# Patient Record
Sex: FEMALE | Race: WHITE
[De-identification: names, ages, dates, MRNs, and addresses within clinical notes are randomized per-mention and may not be internally consistent; named-entity substitution may affect disease eponyms.]

---

## 2019-09-13 NOTE — EDM.PDOC
ED HPI GENERAL MEDICAL PROBLEM





- General


Chief Complaint: Upper Extremity Injury/Pain


Stated Complaint: FALL


Time Seen by Provider: 09/13/19 19:00


Source of Information: Reports: Patient, Family


History Limitations: Reports: No Limitations





- History of Present Illness


INITIAL COMMENTS - FREE TEXT/NARRATIVE: 





This patient presents to the ED for evaluation of right shoulder pain. She 

states she was entering a store when she tripped on the door frame and fell 

forward. She is complaining of right shoulder pain. She also has significant 

swelling of both eyes; significant bruising to right eye and moderate bruising 

to right eye. She states she did not lose consciousness and is able to recount 

the details of the fall. She denies headache, nausea, vomiting. She admits to 

having 2 alcohol drinks prior to the fall.


Onset: Today, Sudden


Onset Date: 09/13/19


Onset Time: 17:30


Location: Reports: Face, Upper Extremity, Right





- Related Data


 Allergies











Allergy/AdvReac Type Severity Reaction Status Date / Time


 


No Known Allergies Allergy   Verified 09/13/19 19:17











Home Meds: 


 Home Meds





. [Unable to Verify Home Med List]  09/13/19 [History]











Review of Systems





- Review of Systems


Review Of Systems: See Below


Constitutional: Reports: No Symptoms


Eyes: Reports: Other (bruising and swelling).  Denies: Blurred Vision, Pain, 

Photophobia, Vision Change


Ears: Reports: No Symptoms


Nose: Reports: No Symptoms


Mouth/Throat: Reports: No Symptoms


Respiratory: Denies: Shortness of Breath, Wheezing, Cough


Cardiovascular: Reports: No Symptoms


GI/Abdominal: Reports: No Symptoms


Musculoskeletal: Reports: Shoulder Pain


Skin: Reports: Bruising


Neurological: Reports: No Symptoms





ED EXAM, GENERAL





- Physical Exam


Exam: See Below


Exam Limited By: No Limitations


General Appearance: Alert, WD/WN, No Apparent Distress


Eye Exam: Bilateral Eye: Normal Fundi, PERRL, Other (Significant swelling and 

bruising of right eye; moderate swelling and bruising of left eye. Ocular 

movements intact.)


Ears: Normal External Exam


Nose: Normal Inspection


Throat/Mouth: Normal Inspection, Normal Teeth


Head: Atraumatic, Normocephalic


Neck: Normal Inspection, Supple, Non-Tender, Full Range of Motion


Respiratory/Chest: No Respiratory Distress


Extremities: Normal Inspection, Joint Swelling (small amount swelling over AC 

joint; no discoloration or deformity. Tenderness with palpation of AC joint.), 

Limited Range of Motion (right shoulder)


Skin Exam: Warm, Dry





Course





- Vital Signs


Last Recorded V/S: 


 Last Vital Signs











Temp  36.2 C   09/13/19 20:07


 


Pulse  81   09/13/19 20:07


 


Resp  20   09/13/19 20:07


 


BP  147/74 H  09/13/19 20:07


 


Pulse Ox  94 L  09/13/19 20:07














- Orders/Labs/Meds


Orders: 


 Active Orders 24 hr











 Category Date Time Status


 


 Head wo Cont [CT] Stat Exams  09/13/19 19:18 Ordered


 


 Shoulder Comp Rt [CR] Stat Exams  09/13/19 19:08 Ordered











Meds: 


Medications














Discontinued Medications














Generic Name Dose Route Start Last Admin





  Trade Name Pbq  PRN Reason Stop Dose Admin


 


Hydromorphone HCl  1 mg  09/13/19 19:37  09/13/19 19:47





  Dilaudid  IM  09/13/19 19:38  1 mg





  ONETIME ONE   Administration





     





     





     





     


 


Hydromorphone HCl  Confirm  09/13/19 19:46  09/13/19 20:01





  Dilaudid  Administered  09/13/19 19:47  Not Given





  Dose   





  2 mg   





  .ROUTE   





  .STK-MED ONE   





     





     





     





     


 


Ketorolac Tromethamine  30 mg  09/13/19 19:04  09/13/19 19:04





  Toradol  IM  09/13/19 19:05  30 mg





  ONETIME ONE   Administration





     





     





     





     














- Re-Assessments/Exams


Free Text/Narrative Re-Assessment/Exam: 





09/13/19 20:32


This patient presents for evaluation of right shoulder pain after a fall. CMS 

is intact distally in the extremity and there are no open injuries.  X-rays 

reveal a transverse fracture of the proximal humerus that does not need 

reduction at this time. I did consult with Dr. Aranda from [facility] who suggest placing the patient in a sling to be seen in their 

office early next week. The patient understands that this need for reduction and

/or surgery may change with time and orthopedic consultation. Close orthopedic 

follow up is indicated in the next 3-4 days. Splint and fracture precautions 

for home. The patient does have some facial swelling and bruising and a head CT 

was obtained. This was negative for acute findings. The head to toe trauma exam 

is otherwise normal at this time and no further trauma workup is needed as I 

believe there is no signs of serious head, neck, chest, spinal, extremity or 

abdominal injuries. The patient was educated on natural course of this fracture

, provided pain medication, need for increasing gentle activity, and possible 

complications of fractures. They need to follow up with [facility] 

early next week. She was given contact information. She should return to the ED 

over the weekend should she develop any new or concerning symptoms. The patient 

was stable at the time of discharge and left in the care of her daughter.





Departure





- Departure


Time of Disposition: 20:45


Disposition: Home, Self-Care 01


Condition: Fair


Clinical Impression: 


 Fracture of humerus








- Discharge Information


*PRESCRIPTION DRUG MONITORING PROGRAM REVIEWED*: No


Forms:  ED Department Discharge, ED Summary Discharge


Additional Instructions: 


Call the [facility] on Monday, September 16 to schedule an 

appointment. The orthopedic specialist on call on Friday was Dr. Aranda. The 

clinic phone number is [phone].





Keep the sling in place at all times over the weekend. Apply ice to the 

fracture site. Rest. Use Vicodin, 1 tablet every 4-6 hours as needed for severe 

pain. Take ibuprofen, 600 mg 3 times a day for less severe pain. Return to the 

ED for any problems over the weekend.





- My Orders


Last 24 Hours: 


My Active Orders





09/13/19 19:08


Shoulder Comp Rt [CR] Stat 





09/13/19 19:18


Head wo Cont [CT] Stat 














- Assessment/Plan


Last 24 Hours: 


My Active Orders





09/13/19 19:08


Shoulder Comp Rt [CR] Stat 





09/13/19 19:18


Head wo Cont [CT] Stat

## 2019-09-15 NOTE — CT
CLINICAL DATA:  Trauma.



UNENHANCED BRAIN CT 13 SEPTEMBER 2019:



Multislice acquisition was performed.  



There is diffuse cerebral atrophy.  There are mild periventricular lucencies 
bilaterally consistent with small vessel ischemic change.  



No masses or mass effect.  No intracranial hemorrhage.  No evidence of acute or 
subacute infarct.  



No osseous abnormalities.  



IMPRESSION:  No acute intracranial abnormalities.  



Job:  [id number]
[facility]

## 2019-09-15 NOTE — CR
CLINICAL DATA:  Trauma.



RIGHT SHOULDER, 13 SEPTEMBER 2019: 



No priors. 



There is diffuse osteopenia. 



There is a mildly impacted, mildly displaced, comminuted fracture through the 
neck and greater tuberosity of the proximal humerus.  



No other acute abnormalities. 



Job:  [id number]
[facility]

## 2019-11-19 ENCOUNTER — HOSPITAL ENCOUNTER (OUTPATIENT)
Dept: HOSPITAL 60 - LB.DI | Age: 74
End: 2019-11-19
Attending: PHYSICIAN ASSISTANT
Payer: MEDICARE

## 2019-11-19 DIAGNOSIS — S42.251A: ICD-10-CM
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
## 2019-11-19 DIAGNOSIS — L84: ICD-10-CM

## 2019-11-19 DIAGNOSIS — S42.291A: Primary | ICD-10-CM

## 2019-11-20 NOTE — CR
Date of  Service:  11/19/19

Clinical Data:  FRACTURE



RIGHT SHOULDER:  



Comparison made to a prior exam dated 09/13/19. 



The fracture through the neck and greater tuberosity of the proximal humerus is 
again seen.  No change in alignment or position from the prior study. There is 
some callus formation across the fracture site.  



No new abnormalities.  



[id number]
[facility]